# Patient Record
Sex: MALE | Race: BLACK OR AFRICAN AMERICAN | URBAN - METROPOLITAN AREA
[De-identification: names, ages, dates, MRNs, and addresses within clinical notes are randomized per-mention and may not be internally consistent; named-entity substitution may affect disease eponyms.]

---

## 2019-06-01 ENCOUNTER — HISTORICAL (OUTPATIENT)
Dept: ADMINISTRATIVE | Facility: HOSPITAL | Age: 54
End: 2019-06-01

## 2019-06-01 LAB
APPEARANCE, UA: CLEAR
BACTERIA #/AREA URNS AUTO: ABNORMAL /[HPF]
BILIRUB SERPL-MCNC: NEGATIVE MG/DL
BILIRUB UR QL STRIP: NEGATIVE
BLOOD URINE, POC: NORMAL
CLARITY, POC UA: CLEAR
COLOR UR: NORMAL
COLOR, POC UA: YELLOW
GLUCOSE (UA): NORMAL
GLUCOSE UR QL STRIP: NEGATIVE
HGB UR QL STRIP: NEGATIVE
HYALINE CASTS #/AREA URNS LPF: ABNORMAL /[LPF]
KETONES UR QL STRIP: NEGATIVE
KETONES UR QL STRIP: NEGATIVE
LEUKOCYTE EST, POC UA: NEGATIVE
LEUKOCYTE ESTERASE UR QL STRIP: NEGATIVE
NITRITE UR QL STRIP: NEGATIVE
NITRITE, POC UA: NEGATIVE
PH UR STRIP: 6.5 [PH] (ref 4.5–8)
PH, POC UA: 6.5
PROT UR QL STRIP: NEGATIVE
PROTEIN, POC: NORMAL
RBC #/AREA URNS AUTO: ABNORMAL /[HPF]
SP GR UR STRIP: 1.02 (ref 1–1.03)
SPECIFIC GRAVITY, POC UA: 1.03
SQUAMOUS #/AREA URNS LPF: ABNORMAL /[LPF]
UROBILINOGEN UR STRIP-ACNC: NORMAL
UROBILINOGEN, POC UA: NORMAL
WBC #/AREA URNS AUTO: ABNORMAL /HPF

## 2020-11-24 ENCOUNTER — HISTORICAL (OUTPATIENT)
Dept: RADIOLOGY | Facility: HOSPITAL | Age: 55
End: 2020-11-24

## 2022-04-07 ENCOUNTER — HISTORICAL (OUTPATIENT)
Dept: ADMINISTRATIVE | Facility: HOSPITAL | Age: 57
End: 2022-04-07
Payer: MEDICAID

## 2022-04-24 VITALS
SYSTOLIC BLOOD PRESSURE: 159 MMHG | WEIGHT: 251.75 LBS | OXYGEN SATURATION: 96 % | HEIGHT: 69 IN | DIASTOLIC BLOOD PRESSURE: 90 MMHG | BODY MASS INDEX: 37.29 KG/M2

## 2022-05-01 NOTE — HISTORICAL [facility]ER
This is a historical note converted from [employer]. Formatting and pictures may have been removed.  Please reference [employer] for original formatting and attached multimedia. Chief Complaint  swelling in scrotum ?x 2 days  state taking Motrin to relieve pain ?rates pain as an 8 with no pain relievers on board  History of Present Illness  Pt is a 55 yo male, here today for swelling in scrotum x 2 days. Rates pain as 0/10 right now. Pt states he wears a harness at work, which irritates the testicle area, states when this happens his pain is 8/10. States when he has pain, he takes ibuprofen, states that he has not had ibuprofen since last night. Denies dysuria, abdominal pain, penile discharge, back pain, body aches, fever, testicle pain?at this time. Pt was seen in ED in 2016 for testicle swelling, pt states his symptoms today are the same symptoms he had previously. During that ED visit, pt was dxd with trichomonas infection, epididymoorchitis and hydrocele of testis. Ultrasound done on that visit, impression: Suspect R sided epididymoorchitis. Moderate R sided hydrocele which appears loculated. Pt was also positive for gonorrhea on 10/18.  Pt states he does have unprotected sex with one partner and uses protection with other partners. Pt states he is heterosexual. Pt states he always has some testicle swelling, states this has happened previously and when it happened before he elevated his testicles which relieved symptoms.  PCP Dr Dawn, wellness scheduled in November. Pt states he does not routinely see a urologist. Denies hx hernia, or abdominal sx.  Review of Systems  Constitutional: negative except as stated in HPI  Eye: negative except as stated in HPI  ENT: negative except as stated in HPI  Respiratory: negative except as stated in HPI  Cardiovascular: negative except as stated in HPI  Gastrointestinal: negative except as stated in HPI  Genitourinary: negative except as stated in HPI  Hema/Lymph: negative  except as stated in HPI  Endocrine: negative except as stated in HPI  Immunologic: negative except as stated in HPI  Musculoskeletal: negative except as stated in HPI  Integumentary: negative except as stated in HPI  Neurologic: negative except as stated in HPI  All Other ROS_ negative except as stated in HPI  ?  ?  Physical Exam  Vitals & Measurements  T:?36.6? ?C (Oral)? HR:?77(Peripheral)? RR:?18? BP:?159/90? SpO2:?96%?  HT:?175?cm? WT:?114.2?kg? BMI:?37.29?  General: Alert and oriented, No acute distress.  Eye:? ?Extraocular movements are intact.  HENT: Normocephalic.  Neck: Supple, Non-tender, No lymphadenopathy.  Respiratory: Lungs are clear to auscultation, Respirations are non-labored, Breath sounds are equal, Symmetrical chest wall expansion.  Cardiovascular: Normal rate, Regular rhythm, No murmur.  Gastrointestinal: Soft, Non-tender, Non-distended, Normal bowel sounds.  Genitourinary: No flank tenderness  Testicular exam: Accompanied by nurse. Generalize edema noted to testicles lexa R>L. No erythema or ecchymosis noted. No inguinal or testicular bulges noted on palpation. Normal cremasteric reflex. No blue dot sign noted. Tenderness on palpation to posterior R testicle.  Musculoskeletal: Normal range of motion.  Integumentary: Warm, Dry, Intact.  Neurologic: No focal deficits, Cranial Nerves II-XII are grossly intact.  ?  Assessment/Plan  1.?Epididymoorchitis?N45.3  POC UA- trace?protein, trace blood. Will send to lab for UA C&S. D/t history of epididymoorchitis with same symptoms, multiple sexual partners, history of?gonorrhea and trich combined with?assessment findings, will treat according to current recommendations with Rocephin 250 mg IM and doxycycline 100mg po BID x 10 days. Will also send urine for screen for gonorrhea, chlamydia, trich. Pt does not want HIV and syphilis testing at this time. Elevate testicles as much as possible. Discussed with pt in depth or symptoms of testicular torsion, red  flag signs and symptoms and when to go to ER. Pt agreed with plan of care, stated he did not want to go to ED at this time for testicular ultrasound, pt voiced understanding of diagnostic possibilities and risks, in particular of untreated testicular torsion. Pt stated he understood red flag signs and symptoms. Discussed scheduling follow up with PCP on Monday.?Will notify pt of abnormal results. ER precautions.  Ordered:  doxycycline, 100 mg = 1 tab(s), Oral, BID, X 10 day(s), # 20 tab(s), 0 Refill(s), Pharmacy: [id number]  After Hrs Visit [id number] PC, Epididymoorchitis  Swollen testicle, 06/01/19 8:27:00 CDT  Office/Outpatient Visit Level 4 Established [id number] PC, Epididymoorchitis  Swollen testicle, 06/01/19 8:27:00 CDT  ?  2.?Swollen testicle?N50.89  ?POC same as above.  Ordered:  doxycycline, 100 mg = 1 tab(s), Oral, BID, X 10 day(s), # 20 tab(s), 0 Refill(s), Pharmacy: [id number]  After Hrs Visit [id number] PC, Epididymoorchitis  Swollen testicle, 06/01/19 8:27:00 CDT  Chlamydia trach and N. gonorrhea PCR, Stat collect, Urine, 06/01/19 7:44:00 CDT, Stop date 06/01/19 7:44:00 CDT, Nurse collect, Swollen testicle  Office/Outpatient Visit Level 4 Established [id number] PC, Epididymoorchitis  Swollen testicle, 06/01/19 8:27:00 CDT  Urinalysis Microscopic [facility], Stat collect, Urine, Collected, 06/01/19 7:43:00 CDT, Stop date 06/01/19 7:43:00 CDT, Nurse collect, Swollen testicle  Urinalysis with Microscopic if Indicated, Stat collect, Urine, 06/01/19 7:42:00 CDT, Stop date 06/01/19 7:43:00 CDT, Nurse collect, Swollen testicle  Urine Culture [id number], Stat collect, 06/01/19 7:42:00 CDT, Urine, Nurse collect, Stop date 06/01/19 7:43:00 CDT, Swollen testicle  ?   Problem List/Past Medical History  Ongoing  Obesity  Historical  No qualifying data  Medications  amLODIPine 10 mg oral tablet, 10 mg= 1 tab(s), Oral, Daily  doxycycline hyclate 100 mg oral tablet, 100 mg= 1 tab(s), Oral, BID  ibuprofen 800 mg oral  tablet  ranitidine 150 mg oral tablet, 150 mg= 1 tab(s), Oral, BID  Allergies  No Known Allergies  No Known Medication Allergies  Social History  Alcohol  Never, 06/01/2019  Current, 1-2 times per year, 07/03/2016  Substance Abuse  Never, 06/01/2019  Tobacco  Never (less than 100 in lifetime), N/A, 06/01/2019  Never smoker, 07/03/2016  Health Maintenance  Health Maintenance  ???Pending?(in the next year)  ??? ??OverDue  ??? ? ? ?Alcohol Misuse Screening due??01/01/19??and every 1??year(s)  ??? ??Due?  ??? ? ? ?ADL Screening due??06/01/19??and every 1??year(s)  ??? ? ? ?Aspirin Therapy for CVD Prevention due??06/01/19??and every 1??year(s)  ??? ? ? ?Tetanus Vaccine due??06/01/19??and every 10??year(s)  ??? ??Due In Future?  ??? ? ? ?Obesity Screening not due until??01/01/20??and every 1??year(s)  ???Satisfied?(in the past 1 year)  ??? ??Satisfied?  ??? ? ? ?Blood Pressure Screening on??06/01/19.??Satisfied by [facility] VIJAY, Megan  ??? ? ? ?Body Mass Index Check on??06/01/19.??Satisfied by [facility] LPMALLY, Megan  ??? ? ? ?Depression Screening on??06/01/19.??Satisfied by [facility] LPN, Megan  ??? ? ? ?Obesity Screening on??06/01/19.??Satisfied by [facility] LPN, Megan  ?  ?  Diagnostic Results  Urinalysis????????  Color Ur Dipstick????Yellow????  Appearance Ur Dipstick????Clear????  pH Ur Dipstick????6.5????  Specific Gravity Ur Dipstick????1.030????  Blood Ur Dipstick????Trace????  Glucose Ur Dipstick????Negative????  Ketones Ur Dipstick????Negative????  Protein Ur Dipstick????Trace????  Bilirubin Ur Dipstick????Negative????  Urobilinogen Ur Dipstick????0.2 mg/dl????  Leukocytes Ur Dipstick????Negative????  Nitrite Ur Dipstick????Negative????  ?

## 2023-12-15 DIAGNOSIS — N39.0 RECURRENT UTI: Primary | ICD-10-CM

## 2024-09-07 ENCOUNTER — HOSPITAL ENCOUNTER (EMERGENCY)
Facility: HOSPITAL | Age: 59
Discharge: HOME | End: 2024-09-07
Attending: EMERGENCY MEDICINE

## 2024-09-07 VITALS
RESPIRATION RATE: 18 BRPM | HEIGHT: 70 IN | HEART RATE: 72 BPM | BODY MASS INDEX: 37.65 KG/M2 | SYSTOLIC BLOOD PRESSURE: 170 MMHG | DIASTOLIC BLOOD PRESSURE: 80 MMHG | TEMPERATURE: 99 F | OXYGEN SATURATION: 100 % | WEIGHT: 263 LBS

## 2024-09-07 DIAGNOSIS — N34.2 URETHRITIS: Primary | ICD-10-CM

## 2024-09-07 LAB
BACTERIA #/AREA URNS AUTO: ABNORMAL /HPF
BILIRUB UR QL STRIP.AUTO: NEGATIVE
CLARITY UR: CLEAR
COLOR UR AUTO: YELLOW
GLUCOSE UR QL STRIP: NEGATIVE
HGB UR QL STRIP: NEGATIVE
KETONES UR QL STRIP: ABNORMAL
LEUKOCYTE ESTERASE UR QL STRIP: NEGATIVE
NITRITE UR QL STRIP: NEGATIVE
PH UR STRIP: 6 [PH]
PROT UR QL STRIP: NEGATIVE
RBC #/AREA URNS AUTO: ABNORMAL /HPF
SP GR UR STRIP.AUTO: 1.02 (ref 1–1.03)
SQUAMOUS #/AREA URNS AUTO: ABNORMAL /HPF
UROBILINOGEN UR STRIP-ACNC: 0.2
WBC #/AREA URNS AUTO: ABNORMAL /HPF

## 2024-09-07 PROCEDURE — 87491 CHLMYD TRACH DNA AMP PROBE: CPT | Performed by: EMERGENCY MEDICINE

## 2024-09-07 PROCEDURE — 63600175 PHARM REV CODE 636 W HCPCS: Performed by: EMERGENCY MEDICINE

## 2024-09-07 PROCEDURE — 81001 URINALYSIS AUTO W/SCOPE: CPT | Performed by: EMERGENCY MEDICINE

## 2024-09-07 PROCEDURE — 96372 THER/PROPH/DIAG INJ SC/IM: CPT | Performed by: EMERGENCY MEDICINE

## 2024-09-07 PROCEDURE — 25000003 PHARM REV CODE 250: Performed by: EMERGENCY MEDICINE

## 2024-09-07 PROCEDURE — 99284 EMERGENCY DEPT VISIT MOD MDM: CPT | Mod: 25

## 2024-09-07 PROCEDURE — 87591 N.GONORRHOEAE DNA AMP PROB: CPT | Performed by: EMERGENCY MEDICINE

## 2024-09-07 RX ORDER — DOXYCYCLINE 100 MG/1
100 CAPSULE ORAL 2 TIMES DAILY
Qty: 14 CAPSULE | Refills: 0 | Status: SHIPPED | OUTPATIENT
Start: 2024-09-07 | End: 2024-09-14

## 2024-09-07 RX ORDER — DOXYCYCLINE HYCLATE 100 MG
100 TABLET ORAL
Status: COMPLETED | OUTPATIENT
Start: 2024-09-07 | End: 2024-09-07

## 2024-09-07 RX ORDER — CEFTRIAXONE 500 MG/1
500 INJECTION, POWDER, FOR SOLUTION INTRAMUSCULAR; INTRAVENOUS
Status: COMPLETED | OUTPATIENT
Start: 2024-09-07 | End: 2024-09-07

## 2024-09-07 RX ORDER — DOXYCYCLINE 100 MG/1
100 CAPSULE ORAL
Status: DISCONTINUED | OUTPATIENT
Start: 2024-09-07 | End: 2024-09-07

## 2024-09-07 RX ADMIN — DOXYCYCLINE HYCLATE 100 MG: 100 TABLET, COATED ORAL at 03:09

## 2024-09-07 RX ADMIN — CEFTRIAXONE SODIUM 500 MG: 500 INJECTION, POWDER, FOR SOLUTION INTRAMUSCULAR; INTRAVENOUS at 03:09

## 2024-09-07 NOTE — ED PROVIDER NOTES
"NAME:  Anny Spencer  CSN:     [id number]  MRN:    [id number]  ADMIT DATE: 9/7/2024        eMERGENCY dEPARTMENT eNCOUnter    CHIEF COMPLAINT    Chief Complaint   Patient presents with    Penile Discharge     Complains of penile discharge, right testicle swelling, and painful urination since Wednesday. History of trichomonas       HPI      Anny Spencer is a 59 y.o. male who presents to the ED for evaluation of penile discharge in right testicle swelling.  Symptoms associated with dysuria.  He was sexually active.  He denies any fevers, flank pain or vomiting.          ALLERGIES    Review of patient's allergies indicates:  No Known Allergies    PAST MEDICAL HISTORY  Past Medical History:   Diagnosis Date    High cholesterol     Hypertension        SURGICAL HISTORY    History reviewed. No pertinent surgical history.    SOCIAL HISTORY    Social History     Socioeconomic History    Marital status: Single   Tobacco Use    Smoking status: Never    Smokeless tobacco: Never       FAMILY HISTORY    No family history on file.    REVIEW OF SYSTEMS   ROS  All Systems otherwise negative except as noted in the History of Present Illness.        PHYSICAL EXAM    Reviewed Triage Note  VITAL SIGNS:   ED Triage Vitals [09/07/24 1501]   Enc Vitals Group      BP (!) 209/90      Pulse 85      Resp 18      Temp 98.5 °F (36.9 °C)      Temp Source Oral      SpO2 98 %      Weight 263 lb      Height 5' 10"      Head Circumference       Peak Flow       Pain Score       Pain Loc       Pain Education       Exclude from Growth Chart        Patient Vitals for the past 24 hrs:   BP Temp Temp src Pulse Resp SpO2 Height Weight   09/07/24 1501 (!) 209/90 98.5 °F (36.9 °C) Oral 85 18 98 % 5' 10" (1.778 m) 119.3 kg (263 lb)           Physical Exam    Constitutional:  Well-developed, well-nourished. No acute distress  HENT:  Normocephalic, atraumatic.  Eyes:  EOMI. Conjunctiva normal without discharge.   Neck: Normal range of motion.No stridor. No meningismus. "   Respiratory:  No respiratory distress, retractions, or conversational dyspnea. Cardiovascular:  Normal heart rate. No pitting lower extremity edema.   Musculoskeletal:  No gross deformity or limited range of motion of all major joints.   Integument:  Warm and dry. No rash.  Neurologic:  Normal motor function. No focal deficits noted. Alert and Interactive.  Psychiatric:  Affect normal. Mood normal.         LABS  Pertinent labs reviewed. (See chart for details)   Labs Reviewed   URINALYSIS, REFLEX TO URINE CULTURE - Abnormal       Result Value    Color, UA Yellow      Appearance, UA Clear      Specific Gravity, UA 1.025      pH, UA 6.0      Protein, UA Negative      Glucose, UA Negative      Ketones, UA Trace (*)     Blood, UA Negative      Bilirubin, UA Negative      Urobilinogen, UA 0.2      Nitrites, UA Negative      Leukocyte Esterase, UA Negative     URINALYSIS, MICROSCOPIC - Abnormal    Bacteria, UA Rare      RBC, UA 0-5      WBC, UA 6-10 (*)     Squamous Epithelial Cells, UA Rare     CHLAMYDIA/GONORRHOEAE(GC), PCR         RADIOLOGY    Imaging Results    None         PROCEDURES    Procedures      EKG     Interpreted by ERP:         ED COURSE & MEDICAL DECISION MAKING    Pertinent & Imaging studies reviewed. (See chart for details and specific orders.)        Medications   cefTRIAXone injection 500 mg (has no administration in time range)   doxycycline capsule 100 mg (has no administration in time range)        Patient will be treated empirically for an STI.         DISPOSITION  Patient discharged in stable condition at No discharge date for patient encounter.      DISCHARGE INSTRUCTIONS & MEDS       Medication List        START taking these medications      doxycycline 100 MG Cap  Commonly known as: VIBRAMYCIN  Take 1 capsule (100 mg total) by mouth 2 (two) times daily. for 7 days               Where to Get Your Medications        These medications were sent to [facility] [place] [id number]  [facility]  [address] [place] [address]      Phone: [phone]   doxycycline 100 MG Cap           New Prescriptions    DOXYCYCLINE (VIBRAMYCIN) 100 MG CAP    Take 1 capsule (100 mg total) by mouth 2 (two) times daily. for 7 days           FINAL IMPRESSION    1. Urethritis              Blood Pressure Follow-Up Advised  Patient advised to follow up with PCP within 3-5 days for blood pressure re-check if blood pressure is equal to or greater than 120/80.         Critical care time spent with this patient (not including separately billable items) was  0 minutes.     DISCLAIMER: This note was prepared with Dragon NaturallySpeaking voice recognition transcription software. Garbled syntax, mangled pronouns, and other bizarre constructions may be attributed to that software system.      Jaime Mclain MD  09/07/2024  3:42 PM           Jaime Mclain MD  09/07/24 [id number]

## 2024-09-08 LAB
C TRACH DNA SPEC QL NAA+PROBE: NOT DETECTED
N GONORRHOEA DNA SPEC QL NAA+PROBE: NOT DETECTED
SOURCE (OHS): NORMAL

## 2024-11-24 ENCOUNTER — OFFICE VISIT (OUTPATIENT)
Dept: URGENT CARE | Facility: CLINIC | Age: 59
End: 2024-11-24
Payer: COMMERCIAL

## 2024-11-24 VITALS
DIASTOLIC BLOOD PRESSURE: 90 MMHG | BODY MASS INDEX: 37.65 KG/M2 | RESPIRATION RATE: 18 BRPM | HEIGHT: 70 IN | WEIGHT: 263 LBS | HEART RATE: 87 BPM | SYSTOLIC BLOOD PRESSURE: 174 MMHG | OXYGEN SATURATION: 97 % | TEMPERATURE: 99 F

## 2024-11-24 DIAGNOSIS — Z20.2 POSSIBLE EXPOSURE TO STD: Primary | ICD-10-CM

## 2024-11-24 DIAGNOSIS — N39.0 RECURRENT UTI: ICD-10-CM

## 2024-11-24 PROCEDURE — 87591 N.GONORRHOEAE DNA AMP PROB: CPT | Performed by: NURSE PRACTITIONER

## 2024-11-24 PROCEDURE — 87661 TRICHOMONAS VAGINALIS AMPLIF: CPT | Performed by: NURSE PRACTITIONER

## 2024-11-24 RX ORDER — TERBINAFINE HYDROCHLORIDE 250 MG/1
250 TABLET ORAL
COMMUNITY
Start: 2024-11-08

## 2024-11-24 RX ORDER — DOXYCYCLINE HYCLATE 100 MG
100 TABLET ORAL 2 TIMES DAILY
Qty: 20 TABLET | Refills: 0 | Status: SHIPPED | OUTPATIENT
Start: 2024-11-24

## 2024-11-24 RX ORDER — AMLODIPINE AND VALSARTAN 5; 160 MG/1; MG/1
1 TABLET ORAL
COMMUNITY
Start: 2024-11-08

## 2024-11-24 RX ORDER — CEFTRIAXONE 500 MG/1
500 INJECTION, POWDER, FOR SOLUTION INTRAMUSCULAR; INTRAVENOUS
Status: COMPLETED | OUTPATIENT
Start: 2024-11-24 | End: 2024-11-24

## 2024-11-24 RX ADMIN — CEFTRIAXONE 500 MG: 500 INJECTION, POWDER, FOR SOLUTION INTRAMUSCULAR; INTRAVENOUS at 04:11

## 2024-11-24 NOTE — PROGRESS NOTES
"Subjective:      Patient ID: Anny Spencer is a 59 y.o. male.    Vitals:  height is 5' 10" (1.778 m) and weight is 119.3 kg (263 lb). His oral temperature is 98.6 °F (37 °C). His blood pressure is 174/90 (abnormal) and his pulse is 87. His respiration is 18 and oxygen saturation is 97%.     Chief Complaint: Penile Discharge     Patient is a 59 y.o. male who presents to urgent care with complaints of penile discharge (white in color), x 5 days.  New sexual partner within the last few weeks no condom   Alleviating factors include none.  Patient denies fever, dysuria.         Genitourinary:  Positive for penile discharge (whitish). Negative for genital sore, penile pain, penile swelling, scrotal swelling and testicular pain.      Objective:     Physical Exam   Abdominal: Normal appearance.   Genitourinary:    Testes and penis normal.     Neurological: He is alert.   Nursing note and vitals reviewed.    Previous History:     Review of patient's allergies indicates:  No Known Allergies    Past Medical History:   Diagnosis Date    High cholesterol     Hypertension      Current Outpatient Medications   Medication Instructions    amlodipine-valsartan (EXFORGE) 5-160 mg per tablet 1 tablet    doxycycline (VIBRA-TABS) 100 mg, Oral, 2 times daily    terbinafine HCL (LAMISIL) 250 mg     Past Surgical History:   Procedure Laterality Date    KNEE SURGERY Right      Family History   Problem Relation Name Age of Onset    No Known Problems Mother      No Known Problems Father         Social History     Tobacco Use    Smoking status: Never    Smokeless tobacco: Never   Substance Use Topics    Alcohol use: Not Currently    Drug use: Never     Assessment:     1. Possible exposure to STD    2. Recurrent UTI        Plan:   We will notify you of the final lab test results.    Take prescription medication as directed.  Doxycycline   Follow-up with the [facility] or your primary care provider.    Return here for concerns.    Safe sex " use condoms.       Possible exposure to STD  -     cefTRIAXone injection 500 mg  -     C.trach/N.gonor AMP RNA; Future; Expected date: 11/24/2024  -     Trichomonas vaginalis Amplified RNA  -     doxycycline (VIBRA-TABS) 100 MG tablet; Take 1 tablet (100 mg total) by mouth 2 (two) times daily.  Dispense: 20 tablet; Refill: 0    Recurrent UTI  -     Ambulatory referral/consult to Urology

## 2024-11-24 NOTE — PATIENT INSTRUCTIONS
We will notify you of the final lab test results.    Take prescription medication as directed.  Doxycycline   Follow-up with the [facility] or your primary care provider.    Return here for concerns.    Safe sex use condoms.

## 2024-11-26 LAB
C TRACH RRNA SPEC QL NAA+PROBE: NEGATIVE
N GONORRHOEA RRNA SPEC QL NAA+PROBE: NEGATIVE
SPECIMEN SOURCE: NORMAL
T VAGINALIS RRNA SPEC QL NAA+PROBE: NEGATIVE

## 2025-02-09 ENCOUNTER — OFFICE VISIT (OUTPATIENT)
Dept: URGENT CARE | Facility: CLINIC | Age: 60
End: 2025-02-09
Payer: COMMERCIAL

## 2025-02-09 VITALS
SYSTOLIC BLOOD PRESSURE: 135 MMHG | BODY MASS INDEX: 37.22 KG/M2 | OXYGEN SATURATION: 98 % | DIASTOLIC BLOOD PRESSURE: 76 MMHG | WEIGHT: 260 LBS | HEIGHT: 70 IN | TEMPERATURE: 98 F | HEART RATE: 88 BPM | RESPIRATION RATE: 18 BRPM

## 2025-02-09 DIAGNOSIS — N50.812 TESTICULAR PAIN, LEFT: Primary | ICD-10-CM

## 2025-02-09 DIAGNOSIS — N50.89 TESTICULAR SWELLING, LEFT: ICD-10-CM

## 2025-02-09 NOTE — PROGRESS NOTES
"Subjective:      Patient ID: Anny Spencer is a 59 y.o. male.    Vitals:  height is 5' 10" (1.778 m) and weight is 117.9 kg (260 lb). His oral temperature is 98.4 °F (36.9 °C). His blood pressure is 135/76 and his pulse is 88. His respiration is 18 and oxygen saturation is 98%.     Chief Complaint: Edema     Patient is a 59 y.o. male who presents to urgent care with complaints of painful swollen left testicle x3 days, states had same problem recently on right requiring surgery. Alleviating factors include Motrin with mild amount of relief. Patient denies fever.        Constitution: Negative.   HENT: Negative.     Neck: neck negative.   Eyes: Negative.    Respiratory: Negative.     Gastrointestinal: Negative.    Genitourinary:  Positive for scrotal swelling and testicular pain.   Skin: Negative.    Allergic/Immunologic: Negative.    Neurological: Negative.    Hematologic/Lymphatic: Negative.       Objective:     Physical Exam   Constitutional: He is oriented to person, place, and time. He appears well-developed. He is cooperative. He does not appear ill.   HENT:   Head: Normocephalic and atraumatic.   Ears:   Right Ear: Hearing, tympanic membrane, external ear and ear canal normal.   Left Ear: Hearing, tympanic membrane, external ear and ear canal normal.   Nose: Nose normal. No mucosal edema, rhinorrhea, nasal deformity or congestion. No epistaxis. Right sinus exhibits no maxillary sinus tenderness and no frontal sinus tenderness. Left sinus exhibits no maxillary sinus tenderness and no frontal sinus tenderness.   Mouth/Throat: Uvula is midline, oropharynx is clear and moist and mucous membranes are normal. No trismus in the jaw. Normal dentition. No uvula swelling. No oropharyngeal exudate or posterior oropharyngeal erythema.   Eyes: Conjunctivae and lids are normal.   Neck: Trachea normal and phonation normal. Neck supple.   Cardiovascular: Normal rate, regular rhythm, normal heart sounds and normal pulses. "   Pulmonary/Chest: Effort normal and breath sounds normal.   Abdominal: Normal appearance and bowel sounds are normal. Soft.   Genitourinary:         Musculoskeletal: Normal range of motion.         General: Normal range of motion.   Neurological: He is alert and oriented to person, place, and time. He exhibits normal muscle tone.   Skin: Skin is warm, dry and intact. Capillary refill takes less than 2 seconds.   Psychiatric: His speech is normal and behavior is normal. Judgment and thought content normal.   Nursing note and vitals reviewed.      Assessment:     1. Testicular pain, left    2. Testicular swelling, left        Plan:   As discussed, it is recommended that you present to the ER now for further evaluation to prevent a delay in care.     Offered transport via EMS but patient/family declines despite informed risks including dismemberment, disability, death.    Opts for private transport via personal vehicle.      Discuss treatment options with patient.   Advised patient to report to emergency room for possible blood work and advanced imaging due to severe testicular swelling and pain.  Patient refused ambulance service and states he will drive himself to the emergency room in [place].  Patient states he currently lives in [place] and [facility].  No acute distress upon discharge.    Testicular pain, left    Testicular swelling, left

## 2025-02-09 NOTE — PATIENT INSTRUCTIONS
As discussed, it is recommended that you present to the ER now for further evaluation to prevent a delay in care.     Offered transport via EMS but patient/family declines despite informed risks including dismemberment, disability, death.    Opts for private transport via personal vehicle.